# Patient Record
Sex: FEMALE | Race: WHITE | NOT HISPANIC OR LATINO | Employment: UNEMPLOYED | URBAN - METROPOLITAN AREA
[De-identification: names, ages, dates, MRNs, and addresses within clinical notes are randomized per-mention and may not be internally consistent; named-entity substitution may affect disease eponyms.]

---

## 2017-01-26 ENCOUNTER — OFFICE VISIT (OUTPATIENT)
Dept: FAMILY MEDICINE CLINIC | Facility: CLINIC | Age: 37
End: 2017-01-26

## 2017-01-26 VITALS
WEIGHT: 130.6 LBS | DIASTOLIC BLOOD PRESSURE: 78 MMHG | RESPIRATION RATE: 20 BRPM | TEMPERATURE: 98.9 F | HEART RATE: 88 BPM | SYSTOLIC BLOOD PRESSURE: 118 MMHG

## 2017-01-26 DIAGNOSIS — H92.01 OTALGIA OF RIGHT EAR: ICD-10-CM

## 2017-01-26 DIAGNOSIS — H68.101: Primary | ICD-10-CM

## 2017-01-26 PROBLEM — H68.109 EUSTACHIAN TUBE OBSTRUCTION: Status: ACTIVE | Noted: 2017-01-26

## 2017-01-26 PROCEDURE — 99213 OFFICE O/P EST LOW 20 MIN: CPT | Performed by: NURSE PRACTITIONER

## 2017-01-26 RX ORDER — TRIAMCINOLONE ACETONIDE 55 UG/1
2 SPRAY, METERED NASAL DAILY
Qty: 1 BOTTLE | Refills: 1 | Status: SHIPPED | OUTPATIENT
Start: 2017-01-26 | End: 2018-01-26

## 2017-01-26 NOTE — PROGRESS NOTES
Subjective   Marilu Martinez is a 36 y.o. female.     History of Present Illness   Right ear pain started Tuesday  No nasal congestion, no ST  Right side of neck is sore also  No OTC meds to try to feel better.  No fever or chills    The following portions of the patient's history were reviewed and updated as appropriate: allergies, current medications, past family history, past medical history, past social history, past surgical history and problem list.    Review of Systems   Constitutional: Negative.  Negative for chills and fever.   HENT: Positive for ear pain. Negative for ear discharge, hearing loss, rhinorrhea, sinus pressure, sneezing, sore throat and tinnitus.    Eyes: Negative.    Respiratory: Negative.    Cardiovascular: Negative.    Gastrointestinal: Negative.    Endocrine: Negative.    Genitourinary: Negative.    Musculoskeletal: Negative.    Skin: Negative.    Allergic/Immunologic: Negative.    Neurological: Negative.    Hematological: Negative.    Psychiatric/Behavioral: Negative.        Objective   Physical Exam   Constitutional: She is oriented to person, place, and time. Vital signs are normal. She appears well-developed and well-nourished. No distress.   HENT:   Head: Normocephalic.   Right Ear: External ear normal. Right ear retracted TM: small amount of clear bubbles at outter rim of TM no redness or cloudiness.   Left Ear: External ear normal.   Ears:    Nose: Nose normal.   Mouth/Throat: Oropharynx is clear and moist.   Eyes: Lids are normal. Pupils are equal, round, and reactive to light.   Neck: Normal range of motion. Neck supple.   Cardiovascular: Normal rate, regular rhythm and normal heart sounds.    Pulmonary/Chest: Effort normal and breath sounds normal.   Lymphadenopathy:        Head (right side): No submandibular, no tonsillar, no preauricular, no posterior auricular and no occipital adenopathy present.        Head (left side): No submandibular, no tonsillar, no preauricular, no  posterior auricular and no occipital adenopathy present.     She has no cervical adenopathy.        Right cervical: No superficial cervical adenopathy present.       Left cervical: No superficial cervical adenopathy present.   Neurological: She is alert and oriented to person, place, and time.   Skin: Skin is warm, dry and intact. She is not diaphoretic.   Psychiatric: She has a normal mood and affect. Her behavior is normal.   Nursing note and vitals reviewed.      Assessment/Plan   Marilu was seen today for earache.    Diagnoses and all orders for this visit:    Eustachian tube obstruction, right    Otalgia of right ear    Other orders  -     Triamcinolone Acetonide (NASACORT AQ) 55 MCG/ACT nasal inhaler; 2 sprays into each nostril Daily.      Take Ibuprofen OTC for pain, use Zyrtec OTC and Nasacort NS  Pt refuses to use nasal spray but explained that this can help with ETD  F/U as needed

## 2017-01-26 NOTE — MR AVS SNAPSHOT
Marilu Martinez   1/26/2017 10:00 AM   Office Visit    Dept Phone:  [phone]   Encounter #:  [id number]    Provider:  ADONIS العلي   Department:  [facility]                Your Full Care Plan              Today's Medication Changes          These changes are accurate as of: 1/26/17 10:18 AM.  If you have any questions, ask your nurse or doctor.               New Medication(s)Ordered:     Triamcinolone Acetonide 55 MCG/ACT nasal inhaler   Commonly known as:  NASACORT AQ   2 sprays into each nostril Daily.   Started by:  ADONIS العلي            Where to Get Your Medications      [facility] [address]  [phone]  - [phone]   [address] [facility], [facility] [address]     Phone:  [phone]     Triamcinolone Acetonide 55 MCG/ACT nasal inhaler                  Your Updated Medication List          This list is accurate as of: 1/26/17 10:18 AM.  Always use your most recent med list.                Triamcinolone Acetonide 55 MCG/ACT nasal inhaler   Commonly known as:  NASACORT AQ   2 sprays into each nostril Daily.               You Were Diagnosed With        Codes Comments    Eustachian tube obstruction, right    -  Primary ICD-10-CM: H68.101  ICD-9-CM: 381.60     Otalgia of right ear     ICD-10-CM: H92.01  ICD-9-CM: 388.70       Instructions    Earache  An earache, also called otalgia, can be caused by many things. Pain from an earache can be sharp, dull, or burning. The pain may be temporary or constant.  Earaches can be caused by problems with the ear, such as infection in either the middle ear or the ear canal, injury, impacted ear wax, middle ear pressure, or a foreign body in the ear. Ear pain can also result from problems in other areas. This is called referred pain. For example, pain can come from a sore throat, a tooth infection, or problems with the jaw or the  joint between the jaw and the skull (temporomandibular joint, or TMJ).  The cause of an earache is not always easy to identify. Watchful waiting may be appropriate for some earaches until a clear cause of the pain can be found.  HOME CARE INSTRUCTIONS  Watch your condition for any changes. The following actions may help to lessen any discomfort that you are feeling:  · Take medicines only as directed by your health care provider. This includes ear drops.  · Apply ice to your outer ear to help reduce pain.    Put ice in a plastic bag.    Place a towel between your skin and the bag.    Leave the ice on for 20 minutes, 2-3 times per day.  · Do not put anything in your ear other than medicine that is prescribed by your health care provider.  · Try resting in an upright position instead of lying down. This may help to reduce pressure in the middle ear and relieve pain.  · Chew gum if it helps to relieve your ear pain.  · Control any allergies that you have.  · Keep all follow-up visits as directed by your health care provider. This is important.  SEEK MEDICAL CARE IF:  · Your pain does not improve within 2 days.  · You have a fever.  · You have new or worsening symptoms.  SEEK IMMEDIATE MEDICAL CARE IF:  · You have a severe headache.  · You have a stiff neck.  · You have difficulty swallowing.  · You have redness or swelling behind your ear.  · You have drainage from your ear.  · You have hearing loss.  · You feel dizzy.     This information is not intended to replace advice given to you by your health care provider. Make sure you discuss any questions you have with your health care provider.     Document Released: 08/04/2005 Document Revised: 01/08/2016 Document Reviewed: 07/19/2015  Elsevier Interactive Patient Education ©2016 [employer]       Patient Instructions History      Upcoming Appointments     Visit Type Date Time Department    OFFICE VISIT 1/26/2017 10:00 AM [facility] Signup      [facility] [employer] allows you to send messages to your doctor, view your test results, renew your prescriptions, schedule appointments, and more. To sign up, go to [employer] and click on the Sign Up Now link in the New User? box. Enter your [employer] Activation Code exactly as it appears below along with the last four digits of your Social Security Number and your Date of Birth () to complete the sign-up process. If you do not sign up before the expiration date, you must request a new code.    [employer] Activation Code: [email or web address]  Expires: 2017 10:18 AM    If you have questions, you can email [email or web address] or call [phone] to talk to our [employer] staff. Remember, [employer] is NOT to be used for urgent needs. For medical emergencies, dial 911.               Other Info from Your Visit           Allergies     Azithromycin        Reason for Visit     Earache R ear since Tuesday      Vital Signs     Blood Pressure Pulse Temperature Respirations Weight Smoking Status    118/78 88 98.9 °F (37.2 °C) 20 130 lb 9.6 oz (59.2 kg) Never Assessed      Problems and Diagnoses Noted     Eustachian tube obstruction    Ear pain

## 2018-05-31 ENCOUNTER — TELEPHONE (OUTPATIENT)
Dept: FAMILY MEDICINE CLINIC | Facility: CLINIC | Age: 38
End: 2018-05-31

## 2018-05-31 RX ORDER — PREDNISONE 20 MG/1
TABLET ORAL
Qty: 16 TABLET | Refills: 0 | Status: SHIPPED | OUTPATIENT
Start: 2018-05-31 | End: 2019-08-13

## 2019-08-13 ENCOUNTER — OFFICE VISIT (OUTPATIENT)
Dept: FAMILY MEDICINE CLINIC | Facility: CLINIC | Age: 39
End: 2019-08-13

## 2019-08-13 VITALS
HEART RATE: 85 BPM | BODY MASS INDEX: 23.22 KG/M2 | HEIGHT: 64 IN | OXYGEN SATURATION: 98 % | SYSTOLIC BLOOD PRESSURE: 104 MMHG | DIASTOLIC BLOOD PRESSURE: 68 MMHG | WEIGHT: 136 LBS | RESPIRATION RATE: 16 BRPM

## 2019-08-13 DIAGNOSIS — M25.562 CHRONIC PAIN OF BOTH KNEES: Primary | ICD-10-CM

## 2019-08-13 DIAGNOSIS — M25.561 CHRONIC PAIN OF BOTH KNEES: Primary | ICD-10-CM

## 2019-08-13 DIAGNOSIS — G89.29 CHRONIC PAIN OF BOTH KNEES: Primary | ICD-10-CM

## 2019-08-13 DIAGNOSIS — Z13.6 ENCOUNTER FOR LIPID SCREENING FOR CARDIOVASCULAR DISEASE: ICD-10-CM

## 2019-08-13 DIAGNOSIS — M17.0 OSTEOARTHRITIS OF BOTH KNEES, UNSPECIFIED OSTEOARTHRITIS TYPE: ICD-10-CM

## 2019-08-13 DIAGNOSIS — E55.9 VITAMIN D DEFICIENCY: ICD-10-CM

## 2019-08-13 DIAGNOSIS — Z13.220 ENCOUNTER FOR LIPID SCREENING FOR CARDIOVASCULAR DISEASE: ICD-10-CM

## 2019-08-13 PROCEDURE — 99214 OFFICE O/P EST MOD 30 MIN: CPT | Performed by: PHYSICIAN ASSISTANT

## 2019-08-13 RX ORDER — MELOXICAM 15 MG/1
15 TABLET ORAL DAILY
Qty: 30 TABLET | Refills: 2 | Status: SHIPPED | OUTPATIENT
Start: 2019-08-13

## 2019-08-13 RX ORDER — CYCLOBENZAPRINE HCL 10 MG
5-10 TABLET ORAL NIGHTLY PRN
Qty: 30 TABLET | Refills: 2 | Status: SHIPPED | OUTPATIENT
Start: 2019-08-13

## 2019-08-13 NOTE — PROGRESS NOTES
"Subjective   Marilu Martinez is a 38 y.o. female.     History of Present Illness   Pt presents with CC of bilateral knee pain   Reports hx of significant knee arthritis bilaterally. L >R. States L is \"stage 4 DJD\" and R is \"Stage 3\"  Takes Aleve BID most days, but does not seem to be helping   Has seen arthritis and pain specialist in [place].  Had rooster comb injections with physical therapy after. Has done very little to help   Physical therapy exercises at home. Wears knee braces at times  No hx of injury, excessive weight or sports when she was younger- reports issues started when she was 17  Some joint swelling. Mother has psoriatic arthritis   Has had XR imaging to this point. No MRI imaging   Has not seen ortho in some time. Not considering surgery at this time. In the future would like Dr. Musa (did her father's  Hip replacement)     Needs screening labs has never had before     The following portions of the patient's history were reviewed and updated as appropriate: allergies, current medications, past family history, past medical history, past social history, past surgical history and problem list.    Review of Systems   Constitutional: Negative.  Negative for chills, diaphoresis, fatigue and fever.   HENT: Negative.  Negative for congestion, ear discharge, ear pain, hearing loss, nosebleeds, postnasal drip, sinus pressure, sneezing and sore throat.    Eyes: Negative.    Respiratory: Negative.  Negative for cough, chest tightness, shortness of breath and wheezing.    Cardiovascular: Negative.  Negative for chest pain, palpitations and leg swelling.   Gastrointestinal: Negative for abdominal distention, abdominal pain, blood in stool, constipation, diarrhea, nausea and vomiting.   Genitourinary: Negative.  Negative for difficulty urinating, dysuria, flank pain, frequency, hematuria and urgency.   Musculoskeletal: Positive for arthralgias and joint swelling. Negative for back pain, gait problem, " "myalgias, neck pain and neck stiffness.   Skin: Negative.  Negative for color change, pallor, rash and wound.   Neurological: Negative for dizziness, syncope, weakness, light-headedness, numbness and headaches.       Objective    Blood pressure 104/68, pulse 85, resp. rate 16, height 162.6 cm (64\"), weight 61.7 kg (136 lb), SpO2 98 %, not currently breastfeeding.     Physical Exam   Constitutional: She is oriented to person, place, and time. She appears well-developed and well-nourished.   HENT:   Head: Normocephalic and atraumatic.   Right Ear: External ear normal.   Left Ear: External ear normal.   Nose: Nose normal.   Mouth/Throat: Oropharynx is clear and moist. No oropharyngeal exudate.   Eyes: Conjunctivae are normal.   Neck: Normal range of motion. Neck supple. No tracheal deviation present. No thyromegaly present.   Cardiovascular: Normal rate, regular rhythm, normal heart sounds and intact distal pulses. Exam reveals no gallop and no friction rub.   No murmur heard.  Pulmonary/Chest: Effort normal and breath sounds normal. No respiratory distress. She has no wheezes. She has no rales. She exhibits no tenderness.   Abdominal: Soft. Bowel sounds are normal. She exhibits no distension and no mass. There is no tenderness. There is no rebound and no guarding. No hernia.   Musculoskeletal: Normal range of motion. She exhibits no edema or deformity.        Right knee: She exhibits bony tenderness. She exhibits normal range of motion and no swelling. Tenderness found. Medial joint line, lateral joint line and patellar tendon tenderness noted.        Left knee: She exhibits bony tenderness. She exhibits normal range of motion and no swelling. Tenderness found. Medial joint line, lateral joint line and patellar tendon tenderness noted.   Lymphadenopathy:     She has no cervical adenopathy.   Neurological: She is alert and oriented to person, place, and time.   Skin: Skin is warm and dry.   Psychiatric: She has a " normal mood and affect. Her behavior is normal. Judgment and thought content normal.   Nursing note and vitals reviewed.      Assessment/Plan   Marilu was seen today for knee pain.    Diagnoses and all orders for this visit:    Chronic pain of both knees  -     meloxicam (MOBIC) 15 MG tablet; Take 1 tablet by mouth Daily.  -     diclofenac (VOLTAREN) 1 % gel gel; Apply 4 g topically to the appropriate area as directed 4 (Four) Times a Day As Needed (knee pain).  -     cyclobenzaprine (FLEXERIL) 10 MG tablet; Take 0.5-1 tablets by mouth At Night As Needed for Muscle Spasms.  -     CBC & Differential  -     Comprehensive metabolic panel  -     WALTER  -     Rheumatoid Factor, Quant  -     Sedimentation Rate  -     C-reactive Protein    Osteoarthritis of both knees, unspecified osteoarthritis type  -     meloxicam (MOBIC) 15 MG tablet; Take 1 tablet by mouth Daily.  -     diclofenac (VOLTAREN) 1 % gel gel; Apply 4 g topically to the appropriate area as directed 4 (Four) Times a Day As Needed (knee pain).  -     cyclobenzaprine (FLEXERIL) 10 MG tablet; Take 0.5-1 tablets by mouth At Night As Needed for Muscle Spasms.    Encounter for lipid screening for cardiovascular disease  -     Lipid Panel    Vitamin D deficiency  -     Vitamin D 25 Hydroxy    labs as outlined in plan   Will try meloxicam, topical NSAID and muscle relaxer prn for pain  Will need to follow up with PCP, ortho or pain management if not getting any adequate relief. Pt agrees.

## 2019-08-14 LAB
25(OH)D3+25(OH)D2 SERPL-MCNC: 34 NG/ML (ref 30–100)
ALBUMIN SERPL-MCNC: 4.8 G/DL (ref 3.5–5.2)
ALBUMIN/GLOB SERPL: 1.9 G/DL
ALP SERPL-CCNC: 75 U/L (ref 39–117)
ALT SERPL-CCNC: 18 U/L (ref 1–33)
ANA SER QL: NEGATIVE
AST SERPL-CCNC: 17 U/L (ref 1–32)
BASOPHILS # BLD AUTO: 0.07 10*3/MM3 (ref 0–0.2)
BASOPHILS NFR BLD AUTO: 0.6 % (ref 0–1.5)
BILIRUB SERPL-MCNC: 0.3 MG/DL (ref 0.2–1.2)
BUN SERPL-MCNC: 17 MG/DL (ref 6–20)
BUN/CREAT SERPL: 17.7 (ref 7–25)
CALCIUM SERPL-MCNC: 9.6 MG/DL (ref 8.6–10.5)
CHLORIDE SERPL-SCNC: 102 MMOL/L (ref 98–107)
CHOLEST SERPL-MCNC: 209 MG/DL (ref 0–200)
CO2 SERPL-SCNC: 26 MMOL/L (ref 22–29)
CREAT SERPL-MCNC: 0.96 MG/DL (ref 0.57–1)
CRP SERPL-MCNC: 0.08 MG/DL (ref 0–0.5)
EOSINOPHIL # BLD AUTO: 0.19 10*3/MM3 (ref 0–0.4)
EOSINOPHIL NFR BLD AUTO: 1.6 % (ref 0.3–6.2)
ERYTHROCYTE [DISTWIDTH] IN BLOOD BY AUTOMATED COUNT: 12.7 % (ref 12.3–15.4)
ERYTHROCYTE [SEDIMENTATION RATE] IN BLOOD BY WESTERGREN METHOD: 6 MM/HR (ref 0–20)
GLOBULIN SER CALC-MCNC: 2.5 GM/DL
GLUCOSE SERPL-MCNC: 102 MG/DL (ref 65–99)
HCT VFR BLD AUTO: 46.8 % (ref 34–46.6)
HDLC SERPL-MCNC: 55 MG/DL (ref 40–60)
HGB BLD-MCNC: 14.7 G/DL (ref 12–15.9)
IMM GRANULOCYTES # BLD AUTO: 0.06 10*3/MM3 (ref 0–0.05)
IMM GRANULOCYTES NFR BLD AUTO: 0.5 % (ref 0–0.5)
LDLC SERPL CALC-MCNC: 128 MG/DL (ref 0–100)
LYMPHOCYTES # BLD AUTO: 3.24 10*3/MM3 (ref 0.7–3.1)
LYMPHOCYTES NFR BLD AUTO: 27.9 % (ref 19.6–45.3)
MCH RBC QN AUTO: 29.9 PG (ref 26.6–33)
MCHC RBC AUTO-ENTMCNC: 31.4 G/DL (ref 31.5–35.7)
MCV RBC AUTO: 95.3 FL (ref 79–97)
MONOCYTES # BLD AUTO: 0.77 10*3/MM3 (ref 0.1–0.9)
MONOCYTES NFR BLD AUTO: 6.6 % (ref 5–12)
NEUTROPHILS # BLD AUTO: 7.28 10*3/MM3 (ref 1.7–7)
NEUTROPHILS NFR BLD AUTO: 62.8 % (ref 42.7–76)
NRBC BLD AUTO-RTO: 0 /100 WBC (ref 0–0.2)
PLATELET # BLD AUTO: 264 10*3/MM3 (ref 140–450)
POTASSIUM SERPL-SCNC: 4.7 MMOL/L (ref 3.5–5.2)
PROT SERPL-MCNC: 7.3 G/DL (ref 6–8.5)
RBC # BLD AUTO: 4.91 10*6/MM3 (ref 3.77–5.28)
RHEUMATOID FACT SERPL-ACNC: <10 IU/ML (ref 0–13.9)
SODIUM SERPL-SCNC: 141 MMOL/L (ref 136–145)
TRIGL SERPL-MCNC: 131 MG/DL (ref 0–150)
VLDLC SERPL CALC-MCNC: 26.2 MG/DL
WBC # BLD AUTO: 11.61 10*3/MM3 (ref 3.4–10.8)

## 2019-08-15 DIAGNOSIS — D72.820 LYMPHOCYTOSIS: Primary | ICD-10-CM

## 2019-08-29 ENCOUNTER — RESULTS ENCOUNTER (OUTPATIENT)
Dept: FAMILY MEDICINE CLINIC | Facility: CLINIC | Age: 39
End: 2019-08-29

## 2019-08-29 DIAGNOSIS — D72.820 LYMPHOCYTOSIS: ICD-10-CM

## 2020-02-19 ENCOUNTER — TELEPHONE (OUTPATIENT)
Dept: FAMILY MEDICINE CLINIC | Facility: CLINIC | Age: 40
End: 2020-02-19

## 2020-02-19 NOTE — TELEPHONE ENCOUNTER
Patient  called and stated that the patient was sent to ER and ER stated that patient provider would have to put the order in a Hida Scan. Per patient would like Hida Scan procedure to be done in [place].    Please advise.  Call back:[phone]

## 2020-02-19 NOTE — TELEPHONE ENCOUNTER
Called informed pt  she needed to schedule a hospital f/u since she has not been seen in a while. Will call back to schedule was leaving hospital.

## 2020-02-20 ENCOUNTER — OFFICE VISIT (OUTPATIENT)
Dept: FAMILY MEDICINE CLINIC | Facility: CLINIC | Age: 40
End: 2020-02-20

## 2020-02-20 VITALS
SYSTOLIC BLOOD PRESSURE: 120 MMHG | DIASTOLIC BLOOD PRESSURE: 66 MMHG | WEIGHT: 141 LBS | BODY MASS INDEX: 24.07 KG/M2 | HEIGHT: 64 IN | TEMPERATURE: 97.6 F | HEART RATE: 76 BPM | RESPIRATION RATE: 18 BRPM

## 2020-02-20 DIAGNOSIS — R11.0 NAUSEA: ICD-10-CM

## 2020-02-20 DIAGNOSIS — R10.11 RUQ PAIN: Primary | ICD-10-CM

## 2020-02-20 PROCEDURE — 99213 OFFICE O/P EST LOW 20 MIN: CPT | Performed by: PHYSICIAN ASSISTANT

## 2020-02-20 NOTE — PROGRESS NOTES
Subjective   Marilu Martinez is a 39 y.o. female.     History of Present Illness   Pt presents with CC of RUQ pain and nausea. Also having loose stools   Pain started early yesterday morning.   Went to [facility] ER yesterday morning a couple hours after symptoms started   White blood cell count slightly elevated remainder of labs reportedly normal. (records have been requested)   GB US was normal. It was recommended to her that she get HIDA scan ordered from her PCP office   Pain is in RUQ and radiates to back and R shoulder blade.   Intermittent symptoms for years. Has had normal GB US in the past. Never had HIDA scan   Eating bland diet right now   No vomiting   Drinking lots of fluids   No hx of elevated liver enzymes or hepatitis     The following portions of the patient's history were reviewed and updated as appropriate: allergies, current medications, past family history, past medical history, past social history, past surgical history and problem list.    Review of Systems   Constitutional: Positive for appetite change. Negative for chills, diaphoresis, fatigue and fever.   HENT: Negative.  Negative for congestion, ear discharge, ear pain, hearing loss, nosebleeds, postnasal drip, sinus pressure, sneezing and sore throat.    Eyes: Negative.    Respiratory: Negative.  Negative for cough, chest tightness, shortness of breath and wheezing.    Cardiovascular: Negative.  Negative for chest pain, palpitations and leg swelling.   Gastrointestinal: Positive for abdominal pain, diarrhea and nausea. Negative for abdominal distention, blood in stool, constipation and vomiting.   Genitourinary: Negative.  Negative for difficulty urinating, dysuria, flank pain, frequency, hematuria and urgency.   Musculoskeletal: Negative.  Negative for arthralgias, back pain, gait problem, joint swelling, myalgias, neck pain and neck stiffness.   Skin: Negative.  Negative for color change, pallor, rash and wound.   Neurological: Negative for  "dizziness, syncope, weakness, light-headedness, numbness and headaches.       Objective    Blood pressure 120/66, pulse 76, temperature 97.6 °F (36.4 °C), resp. rate 18, height 162.6 cm (64\"), weight 64 kg (141 lb), not currently breastfeeding.     Physical Exam   Constitutional: She is oriented to person, place, and time. She appears well-developed and well-nourished.   HENT:   Head: Normocephalic and atraumatic.   Right Ear: External ear normal.   Left Ear: External ear normal.   Nose: Nose normal.   Mouth/Throat: Oropharynx is clear and moist. No oropharyngeal exudate.   Eyes: Conjunctivae are normal.   Neck: Normal range of motion. Neck supple. No tracheal deviation present. No thyromegaly present.   Cardiovascular: Normal rate, regular rhythm, normal heart sounds and intact distal pulses. Exam reveals no gallop and no friction rub.   No murmur heard.  Pulmonary/Chest: Effort normal and breath sounds normal. No respiratory distress. She has no wheezes. She has no rales. She exhibits no tenderness.   Abdominal: Soft. Bowel sounds are normal. She exhibits no distension and no mass. There is tenderness in the right upper quadrant. There is guarding and positive Harvey's sign. There is no rebound. No hernia.   Lymphadenopathy:     She has no cervical adenopathy.   Neurological: She is alert and oriented to person, place, and time.   Skin: Skin is warm and dry.   Psychiatric: She has a normal mood and affect. Her behavior is normal. Judgment and thought content normal.   Nursing note and vitals reviewed.      Assessment/Plan   Marilu was seen today for abdominal pain.    Diagnoses and all orders for this visit:    RUQ pain  -     NM HIDA scan with pharmacological intervention    Nausea  -     NM HIDA scan with pharmacological intervention    proceed with HIDA scan as noted  Report back to ER if new or worsening symptoms develop.   If normal consider F/U with GI due to ongoing symptoms            "

## 2020-02-28 ENCOUNTER — TELEPHONE (OUTPATIENT)
Dept: FAMILY MEDICINE CLINIC | Facility: CLINIC | Age: 40
End: 2020-02-28

## 2020-02-28 DIAGNOSIS — R10.11 RUQ PAIN: Primary | ICD-10-CM

## 2020-02-28 DIAGNOSIS — R94.8 ABNORMAL BILIARY HIDA SCAN: ICD-10-CM

## 2020-03-02 NOTE — TELEPHONE ENCOUNTER
Prefer [place]. She is still having a lot of pain and is hurting in the abdominal area. She wants next available.

## 2021-01-15 DIAGNOSIS — M25.561 CHRONIC PAIN OF BOTH KNEES: ICD-10-CM

## 2021-01-15 DIAGNOSIS — M25.562 CHRONIC PAIN OF BOTH KNEES: ICD-10-CM

## 2021-01-15 DIAGNOSIS — G89.29 CHRONIC PAIN OF BOTH KNEES: ICD-10-CM

## 2021-01-15 DIAGNOSIS — M17.0 OSTEOARTHRITIS OF BOTH KNEES, UNSPECIFIED OSTEOARTHRITIS TYPE: ICD-10-CM

## 2021-01-15 RX ORDER — CYCLOBENZAPRINE HCL 10 MG
TABLET ORAL
Qty: 30 TABLET | Refills: 0 | OUTPATIENT
Start: 2021-01-15